# Patient Record
Sex: MALE | Race: WHITE | NOT HISPANIC OR LATINO | Employment: FULL TIME | URBAN - METROPOLITAN AREA
[De-identification: names, ages, dates, MRNs, and addresses within clinical notes are randomized per-mention and may not be internally consistent; named-entity substitution may affect disease eponyms.]

---

## 2024-01-19 ENCOUNTER — HOSPITAL ENCOUNTER (OUTPATIENT)
Dept: GENERAL RADIOLOGY | Facility: HOSPITAL | Age: 29
Discharge: HOME | End: 2024-01-19
Admitting: FAMILY MEDICINE
Payer: COMMERCIAL

## 2024-01-19 ENCOUNTER — TRANSCRIBE ORDERS (OUTPATIENT)
Dept: GENERAL RADIOLOGY | Facility: HOSPITAL | Age: 29
End: 2024-01-19
Payer: COMMERCIAL

## 2024-01-19 DIAGNOSIS — R05.3 PERSISTENT COUGH: Primary | ICD-10-CM

## 2024-01-19 DIAGNOSIS — R05.3 PERSISTENT COUGH: ICD-10-CM

## 2024-01-19 PROCEDURE — 71046 X-RAY EXAM CHEST 2 VIEWS: CPT

## 2024-03-26 ENCOUNTER — OFFICE VISIT (OUTPATIENT)
Dept: PULMONOLOGY | Facility: CLINIC | Age: 29
End: 2024-03-26
Payer: COMMERCIAL

## 2024-03-26 VITALS
TEMPERATURE: 99.1 F | OXYGEN SATURATION: 97 % | RESPIRATION RATE: 18 BRPM | HEART RATE: 91 BPM | SYSTOLIC BLOOD PRESSURE: 126 MMHG | DIASTOLIC BLOOD PRESSURE: 98 MMHG | WEIGHT: 232.31 LBS

## 2024-03-26 DIAGNOSIS — K21.9 GERD WITHOUT ESOPHAGITIS: ICD-10-CM

## 2024-03-26 DIAGNOSIS — R05.3 CHRONIC COUGH: Primary | ICD-10-CM

## 2024-03-26 PROCEDURE — 94726 PLETHYSMOGRAPHY LUNG VOLUMES: CPT | Performed by: INTERNAL MEDICINE

## 2024-03-26 PROCEDURE — 94729 DIFFUSING CAPACITY: CPT | Performed by: INTERNAL MEDICINE

## 2024-03-26 PROCEDURE — 94010 BREATHING CAPACITY TEST: CPT | Performed by: INTERNAL MEDICINE

## 2024-03-26 PROCEDURE — 99204 OFFICE O/P NEW MOD 45 MIN: CPT | Performed by: INTERNAL MEDICINE

## 2024-03-26 RX ORDER — FENOFIBRATE 160 MG/1
160 TABLET ORAL DAILY
COMMUNITY

## 2024-03-26 RX ORDER — FLUTICASONE PROPIONATE 50 MCG
2 SPRAY, SUSPENSION (ML) NASAL DAILY
COMMUNITY

## 2024-03-26 RX ORDER — OMEGA-3S/DHA/EPA/FISH OIL/D3 300MG-1000
400 CAPSULE ORAL DAILY
COMMUNITY

## 2024-03-26 RX ORDER — FLUTICASONE FUROATE 100 UG/1
1 POWDER RESPIRATORY (INHALATION) DAILY
COMMUNITY

## 2024-03-26 RX ORDER — ESOMEPRAZOLE MAGNESIUM 40 MG/1
40 CAPSULE, DELAYED RELEASE ORAL
COMMUNITY

## 2024-03-26 RX ORDER — TIRZEPATIDE 2.5 MG/.5ML
2.5 INJECTION, SOLUTION SUBCUTANEOUS WEEKLY
COMMUNITY

## 2024-03-26 RX ORDER — FAMOTIDINE 40 MG/1
40 TABLET, FILM COATED ORAL 2 TIMES DAILY
COMMUNITY
Start: 2023-11-16 | End: 2024-11-15

## 2024-03-26 RX ORDER — ROSUVASTATIN CALCIUM 10 MG/1
10 TABLET, COATED ORAL DAILY
COMMUNITY

## 2024-03-26 NOTE — PROGRESS NOTES
New Patient Pulmonary Office Visit      Patient Name: Cody Thomason    Referring Physician: Bessy Daigle APRN    Chief Complaint:    Chief Complaint   Patient presents with    Cough       History of Present Illness: Cody Thomason is a 28 y.o. male who is here today to establish care with Pulmonary.  Patient is a past medical history significant for obesity and hyperlipidemia.  Who was referred to pulmonary for evaluation of a chronic cough.  Patient states that he has had a cough now for over a year, but started having significant weight loss over the last couple of months and now his cough is gone away.  He recently started Zepbound to help with weight loss.  Underwent testing for reflux and found to have significant reflux associated with his cough but had to have weight loss before they will consider surgery.  Is on Nexium and Pepcid.  Has been on Arnuity for over a year now with little benefit,.  Also on Flonase and second-generation antihistamine.    Review of Systems:   Review of Systems   Constitutional:  Negative for chills, fatigue and fever.   HENT:  Negative for congestion and voice change.    Eyes:  Negative for blurred vision.   Respiratory:  Negative for cough, shortness of breath and wheezing.    Cardiovascular:  Negative for chest pain.   Skin:  Negative for dry skin.   Hematological:  Negative for adenopathy.   Psychiatric/Behavioral:  Negative for agitation and depressed mood.        Past Medical History:   Past Medical History:   Diagnosis Date    GERD (gastroesophageal reflux disease)     Hyperlipemia        Past Surgical History:   Past Surgical History:   Procedure Laterality Date    BRAVO PROCEDURE      OSTEOTOMY      WISDOM TOOTH EXTRACTION         Family History:   Family History   Problem Relation Age of Onset    Hypertension Mother        Social History:   Social History     Socioeconomic History    Marital status:    Tobacco Use    Smoking status: Never     Passive  exposure: Never   Vaping Use    Vaping status: Never Used   Substance and Sexual Activity    Alcohol use: Yes     Alcohol/week: 1.0 standard drink of alcohol     Types: 1 Glasses of wine per week    Drug use: Never    Sexual activity: Defer       Medications:     Current Outpatient Medications:     Arnuity Ellipta 100 MCG/ACT aerosol powder , Inhale 1 puff Daily., Disp: , Rfl:     Cholecalciferol 10 MCG (400 UNIT) tablet, Take 1 tablet by mouth Daily., Disp: , Rfl:     esomeprazole (nexIUM) 40 MG capsule, Take 1 capsule by mouth Every Morning Before Breakfast., Disp: , Rfl:     famotidine (PEPCID) 40 MG tablet, Take 1 tablet by mouth 2 (Two) Times a Day., Disp: , Rfl:     fenofibrate 160 MG tablet, Take 1 tablet by mouth Daily., Disp: , Rfl:     fluticasone (FLONASE) 50 MCG/ACT nasal spray, 2 sprays into the nostril(s) as directed by provider Daily., Disp: , Rfl:     rosuvastatin (CRESTOR) 10 MG tablet, Take 1 tablet by mouth Daily., Disp: , Rfl:     Zepbound 2.5 MG/0.5ML solution auto-injector, Inject 0.5 mL under the skin into the appropriate area as directed 1 (One) Time Per Week., Disp: , Rfl:     Allergies:   No Known Allergies    Physical Exam:  Vital Signs:   Vitals:    03/26/24 1213   BP: 126/98   Pulse: 91   Resp: 18   Temp: 99.1 °F (37.3 °C)   SpO2: 97%  Comment: room air at rest   Weight: 105 kg (232 lb 5 oz)       Physical Exam  Vitals and nursing note reviewed.   Constitutional:       General: He is not in acute distress.     Appearance: He is well-developed and normal weight. He is not ill-appearing or toxic-appearing.   Cardiovascular:      Rate and Rhythm: Normal rate and regular rhythm.      Pulses: Normal pulses.      Heart sounds: Normal heart sounds. No murmur heard.     No gallop.   Pulmonary:      Effort: Pulmonary effort is normal.      Breath sounds: Normal breath sounds. No wheezing, rhonchi or rales.   Musculoskeletal:      Right lower leg: No edema.      Left lower leg: No edema.    Neurological:      Mental Status: He is alert.         Immunization History   Administered Date(s) Administered    COVID-19 (PFIZER) BIVALENT 12+YRS 10/06/2022    COVID-19 (PFIZER) Purple Cap Monovalent 03/01/2021, 03/30/2021, 10/15/2021    COVID-19 F23 (PFIZER) 12YRS+ (COMIRNATY) 10/05/2023    Fluzone (or Fluarix & Flulaval for VFC) >6mos 10/05/2020    Hpv9 05/27/2021, 08/05/2021, 12/02/2021    Influenza Seasonal Injectable 09/19/2021, 10/06/2022    Tdap 06/06/2023       Results Review:   - I personally reviewed the pts imaging from chest x-ray from 1/19/2024 shows no acute cardiopulmonary process.  - I personally reviewed the pts PFT from 3/26/2024 shows no obstruction or restriction with a normal DLCO.  - I personally reviewed the pts chart with regards to evaluation by the patient's PCP    Assessment / Plan:   Diagnoses and all orders for this visit:    1. Chronic cough (Primary)  2. GERD without esophagitis  -Patient's chest x-ray was normal PFTs are normal.  We discussed that the most common cause of chronic cough are asthma, allergy, and reflux.  He may have asthma currently on Arnuity, but did not see much benefit for over a year until he started having weight loss.  I think the more likely cause of his cough is reflux related specially with the test that he is already had and the benefit of weight loss.  I encouraged him to continue with weight loss for now continue the Nexium and Pepcid but I would recommend he start trying to decrease his medications.  I would start with the Arnuity, and trial off of it for the next 4 weeks.  If there is no return of his cough then discontinue inhalers.  And then subsequently go to the allergy regimen next to start decreasing medications or one of the time.  I did inform her that I would consider decreasing his reflux medications last.  He verbalized understanding this and agreed the plan.  We will make a 6-month follow-up but if he is doing better he does not have to  follow-up.      Follow Up:   Return in about 6 months (around 9/26/2024).     ALICE Zambrano,   Pulmonary and Critical Care Medicine  Note Electronically Signed    Part of this note may be an electronic transcription/translation of spoken language to printed text using the Dragon Dictation System.